# Patient Record
Sex: FEMALE | Race: BLACK OR AFRICAN AMERICAN | Employment: PART TIME
[De-identification: names, ages, dates, MRNs, and addresses within clinical notes are randomized per-mention and may not be internally consistent; named-entity substitution may affect disease eponyms.]

---

## 2022-08-17 ENCOUNTER — OFFICE VISIT (OUTPATIENT)
Dept: FAMILY MEDICINE CLINIC | Facility: CLINIC | Age: 42
End: 2022-08-17
Payer: COMMERCIAL

## 2022-08-17 VITALS
HEART RATE: 79 BPM | BODY MASS INDEX: 51.91 KG/M2 | WEIGHT: 293 LBS | OXYGEN SATURATION: 100 % | DIASTOLIC BLOOD PRESSURE: 82 MMHG | HEIGHT: 63 IN | SYSTOLIC BLOOD PRESSURE: 138 MMHG

## 2022-08-17 DIAGNOSIS — Z13.6 SCREENING FOR HEART DISEASE: ICD-10-CM

## 2022-08-17 DIAGNOSIS — E66.01 MORBID OBESITY (HCC): ICD-10-CM

## 2022-08-17 DIAGNOSIS — G89.29 CHRONIC LEFT HIP PAIN: ICD-10-CM

## 2022-08-17 DIAGNOSIS — G89.29 CHRONIC PAIN OF RIGHT KNEE: Primary | ICD-10-CM

## 2022-08-17 DIAGNOSIS — R53.82 CHRONIC FATIGUE: ICD-10-CM

## 2022-08-17 DIAGNOSIS — M25.561 CHRONIC PAIN OF RIGHT KNEE: Primary | ICD-10-CM

## 2022-08-17 DIAGNOSIS — M25.552 CHRONIC LEFT HIP PAIN: ICD-10-CM

## 2022-08-17 DIAGNOSIS — Z00.00 ANNUAL PHYSICAL EXAM: ICD-10-CM

## 2022-08-17 LAB
ALBUMIN SERPL-MCNC: 3.3 G/DL (ref 3.5–5)
ALBUMIN/GLOB SERPL: 0.9 {RATIO} (ref 1.2–3.5)
ALP SERPL-CCNC: 87 U/L (ref 50–136)
ALT SERPL-CCNC: 16 U/L (ref 12–65)
ANION GAP SERPL CALC-SCNC: 2 MMOL/L (ref 7–16)
AST SERPL-CCNC: 13 U/L (ref 15–37)
BASOPHILS # BLD: 0 K/UL (ref 0–0.2)
BASOPHILS NFR BLD: 1 % (ref 0–2)
BILIRUB SERPL-MCNC: 0.4 MG/DL (ref 0.2–1.1)
BUN SERPL-MCNC: 11 MG/DL (ref 6–23)
CALCIUM SERPL-MCNC: 8.8 MG/DL (ref 8.3–10.4)
CHLORIDE SERPL-SCNC: 110 MMOL/L (ref 98–107)
CHOLEST SERPL-MCNC: 149 MG/DL
CO2 SERPL-SCNC: 25 MMOL/L (ref 21–32)
CREAT SERPL-MCNC: 0.8 MG/DL (ref 0.6–1)
DIFFERENTIAL METHOD BLD: ABNORMAL
EOSINOPHIL # BLD: 0.1 K/UL (ref 0–0.8)
EOSINOPHIL NFR BLD: 1 % (ref 0.5–7.8)
ERYTHROCYTE [DISTWIDTH] IN BLOOD BY AUTOMATED COUNT: 20 % (ref 11.9–14.6)
GLOBULIN SER CALC-MCNC: 3.8 G/DL (ref 2.3–3.5)
GLUCOSE SERPL-MCNC: 105 MG/DL (ref 65–100)
HCT VFR BLD AUTO: 30.4 % (ref 35.8–46.3)
HDLC SERPL-MCNC: 65 MG/DL (ref 40–60)
HDLC SERPL: 2.3 {RATIO}
HGB BLD-MCNC: 9.1 G/DL (ref 11.7–15.4)
IMM GRANULOCYTES # BLD AUTO: 0 K/UL (ref 0–0.5)
IMM GRANULOCYTES NFR BLD AUTO: 0 % (ref 0–5)
LDLC SERPL CALC-MCNC: 69 MG/DL
LYMPHOCYTES # BLD: 2.8 K/UL (ref 0.5–4.6)
LYMPHOCYTES NFR BLD: 35 % (ref 13–44)
MCH RBC QN AUTO: 21.9 PG (ref 26.1–32.9)
MCHC RBC AUTO-ENTMCNC: 29.9 G/DL (ref 31.4–35)
MCV RBC AUTO: 73.3 FL (ref 79.6–97.8)
MONOCYTES # BLD: 0.3 K/UL (ref 0.1–1.3)
MONOCYTES NFR BLD: 4 % (ref 4–12)
NEUTS SEG # BLD: 4.8 K/UL (ref 1.7–8.2)
NEUTS SEG NFR BLD: 59 % (ref 43–78)
NRBC # BLD: 0 K/UL (ref 0–0.2)
PLATELET # BLD AUTO: 387 K/UL (ref 150–450)
PMV BLD AUTO: 9.4 FL (ref 9.4–12.3)
POTASSIUM SERPL-SCNC: 3.8 MMOL/L (ref 3.5–5.1)
PROT SERPL-MCNC: 7.1 G/DL (ref 6.3–8.2)
RBC # BLD AUTO: 4.15 M/UL (ref 4.05–5.2)
SODIUM SERPL-SCNC: 137 MMOL/L (ref 136–145)
TRIGL SERPL-MCNC: 75 MG/DL (ref 35–150)
TSH, 3RD GENERATION: 1.06 UIU/ML (ref 0.36–3.74)
VLDLC SERPL CALC-MCNC: 15 MG/DL (ref 6–23)
WBC # BLD AUTO: 8 K/UL (ref 4.3–11.1)

## 2022-08-17 PROCEDURE — 99204 OFFICE O/P NEW MOD 45 MIN: CPT | Performed by: FAMILY MEDICINE

## 2022-08-17 ASSESSMENT — ENCOUNTER SYMPTOMS
BLOOD IN STOOL: 0
DIARRHEA: 0
CONSTIPATION: 0
EYE DISCHARGE: 0
SHORTNESS OF BREATH: 0
EYE ITCHING: 0
WHEEZING: 0
NAUSEA: 0
BACK PAIN: 0
VOMITING: 0
COUGH: 0
EYE PAIN: 0
RHINORRHEA: 0
SORE THROAT: 0
CHEST TIGHTNESS: 0
EYE REDNESS: 0
ABDOMINAL PAIN: 0
COLOR CHANGE: 0
FACIAL SWELLING: 0
ABDOMINAL DISTENTION: 0
SINUS PRESSURE: 0
SINUS PAIN: 0
STRIDOR: 0

## 2022-08-17 ASSESSMENT — PATIENT HEALTH QUESTIONNAIRE - PHQ9
SUM OF ALL RESPONSES TO PHQ9 QUESTIONS 1 & 2: 0
SUM OF ALL RESPONSES TO PHQ QUESTIONS 1-9: 0
2. FEELING DOWN, DEPRESSED OR HOPELESS: 0
1. LITTLE INTEREST OR PLEASURE IN DOING THINGS: 0
SUM OF ALL RESPONSES TO PHQ QUESTIONS 1-9: 0

## 2022-08-17 NOTE — PROGRESS NOTES
[address] [place] [address]  _______________________________________  Kody Rachel MD                 [address] [place] [address] Drive, [address]. [address], [address] [facility]                                                                                    Phone: [phone]                                                                                    Fax: [phone]    Anabela Salgado is a 36 y.o. female who is seen for evaluation of   Chief Complaint   Patient presents with    Establish Care     Pt hasn't seen a PCP in a few years. Ear Problem     Right ear fullness since having covid 8/2021. Joint Pain     Right knee and left hip pain/arthritis - tylenol and motrin helps. Both shoulders have started aching recently. HPI:   Jasmin Salcedo is a 35 y/o F here to establish care. She has not seen a PCP in a number of years. - c/o multiple joint pain x 10 yrs. R knee, L hip mostly. Constant pain, achy in nature. Rates 6/10, 8-9/10 at worst. Worse with mobility and working- works as after . Better with rest. Knee and ankle swell at times. Hip is more painful with rainy weather. Motrin helps both. - c/o R ear fullness since having covid in 8/2021. Sounds like a swishing sound. Helps to hold head to R side. - pt inquires about wt loss. She has tried limiting carbs, which she finds difficulty with. She is unable to aggressively exercise with her hip and knee pain. Review of Systems:  Review of Systems   Constitutional:  Negative for activity change, appetite change, chills, diaphoresis, fatigue, fever and unexpected weight change. HENT:  Negative for congestion, ear discharge, ear pain, facial swelling, hearing loss, mouth sores, nosebleeds, postnasal drip, rhinorrhea, sinus pressure, sinus pain, sore throat and tinnitus. Eyes:  Negative for pain, discharge, redness, itching and visual disturbance.    Respiratory:  Negative for cough, chest tightness, shortness of breath, wheezing and stridor. Cardiovascular:  Negative for chest pain, palpitations and leg swelling. Gastrointestinal:  Negative for abdominal distention, abdominal pain, blood in stool, constipation, diarrhea, nausea and vomiting. Endocrine: Negative for cold intolerance, heat intolerance, polydipsia, polyphagia and polyuria. Genitourinary:  Negative for decreased urine volume, difficulty urinating, dysuria, flank pain, frequency, hematuria and urgency. Musculoskeletal:  Positive for arthralgias. Negative for back pain, gait problem, joint swelling, myalgias and neck pain. Skin:  Negative for color change, pallor, rash and wound. Neurological:  Negative for dizziness, tremors, seizures, syncope, facial asymmetry, speech difficulty, weakness, light-headedness, numbness and headaches. Psychiatric/Behavioral:  Negative for agitation, behavioral problems, confusion, hallucinations, self-injury, sleep disturbance and suicidal ideas. The patient is not nervous/anxious. History:  History reviewed. No pertinent past medical history. Past Surgical History:   Procedure Laterality Date     SECTION N/A     x 3       Family History   Problem Relation Age of Onset    Schizophrenia Mother     No Known Problems Father        Social History     Tobacco Use    Smoking status: Never    Smokeless tobacco: Never   Substance Use Topics    Alcohol use: Never       No Known Allergies    Current Outpatient Medications   Medication Sig Dispense Refill    diclofenac sodium (VOLTAREN) 1 % GEL Apply 2 g topically 4 times daily 100 g 3     No current facility-administered medications for this visit. Vitals:    /82 (Site: Left Upper Arm, Position: Sitting, Cuff Size: Large Adult)   Pulse 79   Ht 5' 2.5\" (1.588 m)   Wt (!) 343 lb (155.6 kg)   SpO2 100%   BMI 61.74 kg/m²     Physical Exam:  Physical Exam  Vitals reviewed.    Constitutional:       General: She is not in acute distress. Appearance: Normal appearance. She is not ill-appearing, toxic-appearing or diaphoretic. HENT:      Head: Normocephalic and atraumatic. Right Ear: Tympanic membrane, ear canal and external ear normal. There is no impacted cerumen. Left Ear: Tympanic membrane, ear canal and external ear normal. There is no impacted cerumen. Nose: Nose normal. No congestion or rhinorrhea. Mouth/Throat:      Mouth: Mucous membranes are moist.      Pharynx: No oropharyngeal exudate or posterior oropharyngeal erythema. Eyes:      General: No scleral icterus. Right eye: No discharge. Left eye: No discharge. Extraocular Movements: Extraocular movements intact. Conjunctiva/sclera: Conjunctivae normal.      Pupils: Pupils are equal, round, and reactive to light. Cardiovascular:      Rate and Rhythm: Normal rate and regular rhythm. Pulses: Normal pulses. Heart sounds: Normal heart sounds. No murmur heard. No friction rub. No gallop. Pulmonary:      Effort: Pulmonary effort is normal. No respiratory distress. Breath sounds: Normal breath sounds. No stridor. No wheezing, rhonchi or rales. Chest:      Chest wall: No tenderness. Abdominal:      General: Abdomen is flat. Bowel sounds are normal. There is no distension. Palpations: Abdomen is soft. There is no mass. Tenderness: There is no abdominal tenderness. There is no right CVA tenderness, left CVA tenderness, guarding or rebound. Musculoskeletal:         General: No swelling, tenderness, deformity or signs of injury. Cervical back: Neck supple. No rigidity or tenderness. Right knee: Crepitus present. No swelling, deformity or bony tenderness. Left knee: No swelling, deformity, bony tenderness or crepitus. Right lower leg: No edema. Left lower leg: No edema. Lymphadenopathy:      Cervical: No cervical adenopathy. Skin:     General: Skin is warm and dry. c/s will be needed.               Geremias Mcknight MD

## 2022-08-17 NOTE — ASSESSMENT & PLAN NOTE
Discussed wt loss options at length. Encouraged starting small with reduction in portion sizes and sweetened beverages. Goal of 1.5% BW loss/month for now. Increase goal as pt is successful. Otherwise, bariatric surgery c/s will be needed.

## 2022-08-17 NOTE — ASSESSMENT & PLAN NOTE
Not tender on PE. Suspect possible arthritic process given hx. I explained that I think wt loss will have a significant benefit on her pain and with such, allow for increase in activity level which will provide long-term benefit.

## 2022-08-18 ENCOUNTER — TELEPHONE (OUTPATIENT)
Dept: FAMILY MEDICINE CLINIC | Facility: CLINIC | Age: 42
End: 2022-08-18

## 2022-08-18 DIAGNOSIS — D64.9 ANEMIA, UNSPECIFIED TYPE: Primary | ICD-10-CM

## 2022-08-18 NOTE — TELEPHONE ENCOUNTER
The patient has been notified of this information and all questions answered. Elaine Alvarez, can you please add her to the lab schedule for tomorrow 8/19 at 8:45am? She will come in for iron studies.     Arielle Lemus

## 2022-08-18 NOTE — TELEPHONE ENCOUNTER
----- Message from [id number] Patricia Pearce MD sent at 8/18/2022  8:21 AM EDT -----  Please notify pt of abnormal results. Please have pt return for labs only to collect iron studies as her hgb is low.

## 2022-08-19 DIAGNOSIS — D64.9 ANEMIA, UNSPECIFIED TYPE: ICD-10-CM

## 2022-08-19 LAB
FERRITIN SERPL-MCNC: 8 NG/ML (ref 8–388)
IRON SERPL-MCNC: 15 UG/DL (ref 35–150)
TIBC SERPL-MCNC: 436 UG/DL (ref 250–450)

## 2022-09-02 ENCOUNTER — OFFICE VISIT (OUTPATIENT)
Dept: FAMILY MEDICINE CLINIC | Facility: CLINIC | Age: 42
End: 2022-09-02
Payer: COMMERCIAL

## 2022-09-02 VITALS
BODY MASS INDEX: 51.91 KG/M2 | HEIGHT: 63 IN | WEIGHT: 293 LBS | OXYGEN SATURATION: 99 % | SYSTOLIC BLOOD PRESSURE: 136 MMHG | DIASTOLIC BLOOD PRESSURE: 88 MMHG | HEART RATE: 82 BPM

## 2022-09-02 DIAGNOSIS — M25.552 CHRONIC LEFT HIP PAIN: ICD-10-CM

## 2022-09-02 DIAGNOSIS — Z12.31 ENCOUNTER FOR SCREENING MAMMOGRAM FOR MALIGNANT NEOPLASM OF BREAST: ICD-10-CM

## 2022-09-02 DIAGNOSIS — E66.01 MORBID OBESITY (HCC): ICD-10-CM

## 2022-09-02 DIAGNOSIS — D50.0 IRON DEFICIENCY ANEMIA DUE TO CHRONIC BLOOD LOSS: ICD-10-CM

## 2022-09-02 DIAGNOSIS — J30.1 ALLERGIC RHINITIS DUE TO POLLEN, UNSPECIFIED SEASONALITY: ICD-10-CM

## 2022-09-02 DIAGNOSIS — G89.29 CHRONIC LEFT HIP PAIN: ICD-10-CM

## 2022-09-02 DIAGNOSIS — Z00.00 ANNUAL PHYSICAL EXAM: Primary | ICD-10-CM

## 2022-09-02 DIAGNOSIS — R53.82 CHRONIC FATIGUE: ICD-10-CM

## 2022-09-02 DIAGNOSIS — Z01.419 ENCOUNTER FOR GYNECOLOGICAL EXAMINATION WITHOUT ABNORMAL FINDING: ICD-10-CM

## 2022-09-02 PROCEDURE — 99396 PREV VISIT EST AGE 40-64: CPT | Performed by: FAMILY MEDICINE

## 2022-09-02 RX ORDER — DOCUSATE SODIUM 100 MG/1
100 CAPSULE, LIQUID FILLED ORAL 2 TIMES DAILY
Qty: 180 CAPSULE | Refills: 0 | Status: SHIPPED | OUTPATIENT
Start: 2022-09-02 | End: 2022-12-01

## 2022-09-02 RX ORDER — LANOLIN ALCOHOL/MO/W.PET/CERES
325 CREAM (GRAM) TOPICAL
Qty: 270 TABLET | Refills: 0 | Status: SHIPPED | OUTPATIENT
Start: 2022-09-02 | End: 2022-10-31

## 2022-09-02 RX ORDER — ASCORBIC ACID 250 MG
250 TABLET ORAL 3 TIMES DAILY
Qty: 270 TABLET | Refills: 0 | Status: SHIPPED | OUTPATIENT
Start: 2022-09-02 | End: 2022-12-01

## 2022-09-02 RX ORDER — CETIRIZINE HYDROCHLORIDE 10 MG/1
10 TABLET ORAL DAILY
Qty: 30 TABLET | Refills: 0 | Status: SHIPPED | OUTPATIENT
Start: 2022-09-02 | End: 2022-10-02

## 2022-09-02 NOTE — PROGRESS NOTES
[address] [place] [address]  _______________________________________  Cadence Morales MD                 [address] [place] [address],  [address] [place], [address] [place] [address]                     [facility]                                                                                     Phone: [phone]                                                                                    Fax: [phone]    Flori Angulo is a 36 y.o. female who is seen for evaluation of   Chief Complaint   Patient presents with    Annual Exam    Knee Pain     Diclofenac gel didn't help with knee pain       HPI:   Elena Ascencio is here today for CPE. Review of Systems:  Review of Systems     History:  History reviewed. No pertinent past medical history. Past Surgical History:   Procedure Laterality Date     SECTION N/A     x 3       Family History   Problem Relation Age of Onset    Schizophrenia Mother     No Known Problems Father        Social History     Tobacco Use    Smoking status: Never    Smokeless tobacco: Never   Substance Use Topics    Alcohol use: Never       No Known Allergies    Current Outpatient Medications   Medication Sig Dispense Refill    ferrous sulfate (FE TABS 325) 325 (65 Fe) MG EC tablet Take 1 tablet by mouth 3 times daily (with meals) Take Vitamin C with each dose. 270 tablet 0    docusate sodium (COLACE) 100 MG capsule Take 1 capsule by mouth 2 times daily 180 capsule 0    ascorbic acid (V-R VITAMIN C) 250 MG tablet Take 1 tablet by mouth 3 times daily Take with each dose of iron 270 tablet 0    cetirizine (ZYRTEC) 10 MG tablet Take 1 tablet by mouth daily 30 tablet 0    diclofenac sodium (VOLTAREN) 1 % GEL Apply 2 g topically 4 times daily (Patient not taking: Reported on 2022) 100 g 3     No current facility-administered medications for this visit.        Vitals:    /88 (Site: Right Upper Arm, Position: Sitting, Cuff Size: Large Adult)   Pulse 82   Ht 5' 2.5\" (1.588 m)   Wt (!) breast  Z12.31 AKIL Screening Bilateral      6. Chronic fatigue  R53.82       7. Chronic left hip pain  M25.552     G89.29       8. Morbid obesity ([address]  E66.01            Problem List          Other    Chronic left hip pain     Pt will obtain xrays and will call with results. Advised importance of wt loss. Chronic fatigue     Monitor on Fe replacement. Morbid obesity (HCC)      Goal of 3 % loss of bw per month. Encouraged to limit carbs, portion sizes, eat 5 small meals daily, use veggies and nuts as snacks, increase exercise to 30 minutes of aerobic exercise 5 times weekly, weigh daily. Annual physical exam - Primary      Encouraged 5 servings of fruits and veggies daily. Instructed to drink approx 2 L of fluid daily, mostly water. Recommended 30 sustained minutes of aerobic exercise daily (e.g. cycling, rowing, jogging). Limit high calorie processed foods, red meat, egg yolks, dairy products, butter, burns, fried and fast foods. Recommended influenza vaccination annually. Iron deficiency anemia due to chronic blood loss     Start Fe replacement. Recheck cbc and iron in 3 months.           Relevant Medications    ferrous sulfate (FE TABS 325) 325 (65 Fe) MG EC tablet    docusate sodium (COLACE) 100 MG capsule    ascorbic acid (V-R VITAMIN C) 250 MG tablet    Other Relevant Orders    CBC with Auto Differential    Iron    Ferritin    Total Iron Binding Capacity        Kaylee Kwok III, MD

## 2022-09-02 NOTE — ASSESSMENT & PLAN NOTE
Encouraged 5 servings of fruits and veggies daily. Instructed to drink approx 2 L of fluid daily, mostly water. Recommended 30 sustained minutes of aerobic exercise daily (e.g. cycling, rowing, jogging). Limit high calorie processed foods, red meat, egg yolks, dairy products, butter, burns, fried and fast foods. Recommended influenza vaccination annually.

## 2022-10-02 PROBLEM — Z00.00 ANNUAL PHYSICAL EXAM: Status: RESOLVED | Noted: 2022-09-02 | Resolved: 2022-10-02

## 2022-10-11 ASSESSMENT — SOCIAL DETERMINANTS OF HEALTH (SDOH)

## 2022-10-12 ENCOUNTER — HOSPITAL ENCOUNTER (OUTPATIENT)
Dept: GENERAL RADIOLOGY | Age: 42
Discharge: HOME | End: 2022-10-14
Payer: COMMERCIAL

## 2022-10-12 ENCOUNTER — OFFICE VISIT (OUTPATIENT)
Dept: OBGYN CLINIC | Age: 42
End: 2022-10-12
Payer: COMMERCIAL

## 2022-10-12 VITALS
DIASTOLIC BLOOD PRESSURE: 88 MMHG | HEIGHT: 63 IN | WEIGHT: 293 LBS | BODY MASS INDEX: 51.91 KG/M2 | SYSTOLIC BLOOD PRESSURE: 138 MMHG

## 2022-10-12 DIAGNOSIS — Z01.419 WELL WOMAN EXAM WITH ROUTINE GYNECOLOGICAL EXAM: Primary | ICD-10-CM

## 2022-10-12 DIAGNOSIS — Z12.31 ENCOUNTER FOR SCREENING MAMMOGRAM FOR MALIGNANT NEOPLASM OF BREAST: ICD-10-CM

## 2022-10-12 DIAGNOSIS — Z11.51 SCREENING FOR HPV (HUMAN PAPILLOMAVIRUS): ICD-10-CM

## 2022-10-12 DIAGNOSIS — M25.561 CHRONIC PAIN OF RIGHT KNEE: ICD-10-CM

## 2022-10-12 DIAGNOSIS — G89.29 CHRONIC PAIN OF RIGHT KNEE: ICD-10-CM

## 2022-10-12 DIAGNOSIS — Z12.4 PAP SMEAR FOR CERVICAL CANCER SCREENING: ICD-10-CM

## 2022-10-12 DIAGNOSIS — G89.29 CHRONIC LEFT HIP PAIN: ICD-10-CM

## 2022-10-12 DIAGNOSIS — M25.552 CHRONIC LEFT HIP PAIN: ICD-10-CM

## 2022-10-12 PROCEDURE — 99386 PREV VISIT NEW AGE 40-64: CPT | Performed by: OBSTETRICS & GYNECOLOGY

## 2022-10-12 PROCEDURE — 73502 X-RAY EXAM HIP UNI 2-3 VIEWS: CPT

## 2022-10-12 PROCEDURE — 73560 X-RAY EXAM OF KNEE 1 OR 2: CPT

## 2022-10-12 NOTE — PROGRESS NOTES
CC:  Annual     HPI:  36 y.o. No obstetric history on file. presents today for a routine gynecological examination. Patient's last menstrual period was 2022. PCP: Yes, [facility]   Iron def anemia. Last Hg 9.1    Contraception:  not sexually active       Menses:  Regular   No contraindications to estrogen exist          GYN hx:  As per HPI    Last Pap smear result: unsure  Abnormal Pap history: no    Last MGM never       OB Hx:     OB History    Para Term  AB Living   4 3 3   1 3   SAB IAB Ectopic Molar Multiple Live Births                    # Outcome Date GA Lbr Agustin/2nd Weight Sex Delivery Anes PTL Lv   4 AB            3 Term            2 Term            1 Term              Hx of CS x 3      PMH:  Past Medical History:   Diagnosis Date    Anemia        PSH:  Past Surgical History:   Procedure Laterality Date     SECTION N/A     x 3         Allergies:  No Known Allergies      Social Hx:    reports that she has never smoked. She has never used smokeless tobacco. She reports that she does not currently use drugs. She reports that she does not drink alcohol. .    Family Hx:  Family History   Problem Relation Age of Onset    No Known Problems Father     Schizophrenia Mother     Breast Cancer Neg Hx     Colon Cancer Neg Hx     Ovarian Cancer Neg Hx          Review of Systems   Constitutional: Negative for chills, fever and weight loss. HENT: Negative for hearing loss. Eyes: Negative for blurred vision and double vision. Respiratory: Negative for cough, hemoptysis and shortness of breath. Cardiovascular: Negative for chest pain, palpitations and orthopnea. Gastrointestinal: Negative for abdominal pain, blood in stool, constipation, diarrhea, nausea and vomiting. Genitourinary: Negative for dysuria, frequency, hematuria and urgency. Musculoskeletal: Negative for falls, joint pain and myalgias. Skin: Negative for itching and rash. Neurological: Negative for headaches. Endo/Heme/Allergies: Does not bruise/bleed easily. Psychiatric/Behavioral: Negative for depression and suicidal ideas. The patient is not nervous/anxious. All other systems reviewed and are negative. Physical Exam      Vitals:    10/12/22 0849   BP: 138/88        Constitutional: She appears well-developed and well-nourished. No distress. HENT:    Head: Normocephalic and atraumatic. Neck: Normal range of motion. No thyromegaly present. Cardiovascular: Normal rate, regular rhythm and normal heart sounds. Exam reveals no gallop and no friction rub. No murmur heard. Pulmonary/Chest: Effort normal and breath sounds normal. No respiratory distress. She has no wheezes. She has no rales. Abdominal: Soft. Bowel sounds are normal. She exhibits no distension and no mass. There is no tenderness. There is no rebound and no guarding. Skin: She is not diaphoretic. Psychiatric: She has a normal mood and affect. Her behavior is normal. Thought content normal. .    Pelvic:   External genitalia wnl, no lesions, rashes  Clitoris and urethra midline  Vagina pink, moist, well rugated  Due to patient body habitus, very difficult pelvic exam. Long speculum used on could see anterior lip of cervix, but not entire cervix or os. Pap performed    Breasts:   Symmetric, no lesions, masses, rashes, no abnl nipple Dc. Dense breast tissue      Counseling:  Discussed General Recommendations (pts 40-65yo):  -Routine Pap  (unless cervix removed for benign reasons and not hx high grade dysplasia [ie ENEDELIA 2-3])  -Routine STD testing for high risk individuals   -Routine Mammogram   -lipid profile every 5 yrs  -colonoscopy (>/=46yo)  -Tdap once and then Td every 10yrs  -TSH every 5 years (>50)  -Zoster Vaccine (age 61), single dose ZVL or (age 48) 2 doses RZV  -Influenza Vaccine, annually  -Healthy eating/exercise   -HPV vaccine newest recs (8yo-46yo)        ASSESSMENT/PLAN:   36 y.o. No obstetric history on file.  for annual GYN exam:     -Cotesting today - discussed may be inadequate due to difficult exam    - Mammogram  referral   -safe sexual practices    -FU w/ PCP for all non-GYN medical issues and regular screening for lipids,TSH, diabetes    - If she every misses more than 3 cycles should call and will need cycled with progesterone to avoid endometrial hyperplasia risk in setting of morbid obesity       eNena Raymond, DO

## 2022-10-18 LAB
CYTOLOGIST CVX/VAG CYTO: NORMAL
CYTOLOGY CVX/VAG DOC THIN PREP: NORMAL
HPV APTIMA: NEGATIVE
Lab: NORMAL
PATH REPORT.FINAL DX SPEC: NORMAL
STAT OF ADQ CVX/VAG CYTO-IMP: NORMAL

## 2022-10-21 ENCOUNTER — TELEPHONE (OUTPATIENT)
Dept: FAMILY MEDICINE CLINIC | Facility: CLINIC | Age: 42
End: 2022-10-21

## 2022-10-21 DIAGNOSIS — M16.12 ARTHRITIS OF LEFT HIP: Primary | ICD-10-CM

## 2022-10-21 DIAGNOSIS — M17.11 ARTHRITIS OF RIGHT KNEE: Primary | ICD-10-CM

## 2022-10-24 ENCOUNTER — TELEPHONE (OUTPATIENT)
Dept: FAMILY MEDICINE CLINIC | Facility: CLINIC | Age: 42
End: 2022-10-24

## 2022-10-24 NOTE — TELEPHONE ENCOUNTER
The patient has been notified of this information and all questions answered. Pt will proceed with physical therapy.       Arielle Davila

## 2022-10-24 NOTE — TELEPHONE ENCOUNTER
----- Message from [id number] Patricia Pearce MD sent at 10/21/2022  8:29 AM EDT -----  Please notify pt of abnormal results. Pt has more arthritis of the left hip than expected. I would recommend PT, but wt loss will be the most effective tx. If she does not improve with PT, she will need to see ortho.

## 2022-10-28 DIAGNOSIS — D50.0 IRON DEFICIENCY ANEMIA DUE TO CHRONIC BLOOD LOSS: ICD-10-CM

## 2022-10-31 RX ORDER — LANOLIN ALCOHOL/MO/W.PET/CERES
325 CREAM (GRAM) TOPICAL
Qty: 90 TABLET | Refills: 2 | Status: SHIPPED | OUTPATIENT
Start: 2022-10-31 | End: 2023-01-29

## 2022-11-29 DIAGNOSIS — D50.0 IRON DEFICIENCY ANEMIA DUE TO CHRONIC BLOOD LOSS: ICD-10-CM

## 2022-11-29 LAB
BASOPHILS # BLD: 0.1 K/UL (ref 0–0.2)
BASOPHILS NFR BLD: 1 % (ref 0–2)
DIFFERENTIAL METHOD BLD: ABNORMAL
EOSINOPHIL # BLD: 0.1 K/UL (ref 0–0.8)
EOSINOPHIL NFR BLD: 1 % (ref 0.5–7.8)
ERYTHROCYTE [DISTWIDTH] IN BLOOD BY AUTOMATED COUNT: 21.4 % (ref 11.9–14.6)
HCT VFR BLD AUTO: 33.8 % (ref 35.8–46.3)
HGB BLD-MCNC: 10.1 G/DL (ref 11.7–15.4)
IMM GRANULOCYTES # BLD AUTO: 0 K/UL (ref 0–0.5)
IMM GRANULOCYTES NFR BLD AUTO: 0 % (ref 0–5)
LYMPHOCYTES # BLD: 3.4 K/UL (ref 0.5–4.6)
LYMPHOCYTES NFR BLD: 38 % (ref 13–44)
MCH RBC QN AUTO: 23 PG (ref 26.1–32.9)
MCHC RBC AUTO-ENTMCNC: 29.9 G/DL (ref 31.4–35)
MCV RBC AUTO: 76.8 FL (ref 82–102)
MONOCYTES # BLD: 0.4 K/UL (ref 0.1–1.3)
MONOCYTES NFR BLD: 5 % (ref 4–12)
NEUTS SEG # BLD: 5 K/UL (ref 1.7–8.2)
NEUTS SEG NFR BLD: 56 % (ref 43–78)
NRBC # BLD: 0 K/UL (ref 0–0.2)
PLATELET # BLD AUTO: 396 K/UL (ref 150–450)
PMV BLD AUTO: 8.9 FL (ref 9.4–12.3)
RBC # BLD AUTO: 4.4 M/UL (ref 4.05–5.2)
WBC # BLD AUTO: 9 K/UL (ref 4.3–11.1)

## 2022-11-30 LAB
FERRITIN SERPL-MCNC: 10 NG/ML (ref 8–388)
IRON SERPL-MCNC: 25 UG/DL (ref 35–150)
TIBC SERPL-MCNC: 391 UG/DL (ref 250–450)

## 2022-12-06 ENCOUNTER — OFFICE VISIT (OUTPATIENT)
Dept: FAMILY MEDICINE CLINIC | Facility: CLINIC | Age: 42
End: 2022-12-06
Payer: COMMERCIAL

## 2022-12-06 VITALS
HEART RATE: 84 BPM | BODY MASS INDEX: 51.91 KG/M2 | WEIGHT: 293 LBS | DIASTOLIC BLOOD PRESSURE: 90 MMHG | OXYGEN SATURATION: 94 % | SYSTOLIC BLOOD PRESSURE: 148 MMHG | HEIGHT: 63 IN

## 2022-12-06 DIAGNOSIS — D50.0 IRON DEFICIENCY ANEMIA DUE TO CHRONIC BLOOD LOSS: Primary | ICD-10-CM

## 2022-12-06 DIAGNOSIS — M17.11 TRICOMPARTMENT OSTEOARTHRITIS OF RIGHT KNEE: ICD-10-CM

## 2022-12-06 DIAGNOSIS — E66.01 MORBID OBESITY (HCC): ICD-10-CM

## 2022-12-06 DIAGNOSIS — M16.12 ARTHRITIS OF LEFT HIP: ICD-10-CM

## 2022-12-06 PROCEDURE — 99214 OFFICE O/P EST MOD 30 MIN: CPT | Performed by: FAMILY MEDICINE

## 2022-12-06 RX ORDER — MELOXICAM 7.5 MG/1
7.5 TABLET ORAL DAILY
Qty: 30 TABLET | Refills: 3 | Status: SHIPPED | OUTPATIENT
Start: 2022-12-06

## 2022-12-06 RX ORDER — ASCORBIC ACID 250 MG
250 TABLET ORAL 3 TIMES DAILY
Qty: 270 TABLET | Refills: 0 | Status: SHIPPED | OUTPATIENT
Start: 2022-12-06 | End: 2023-03-06

## 2022-12-06 RX ORDER — LANOLIN ALCOHOL/MO/W.PET/CERES
325 CREAM (GRAM) TOPICAL
Qty: 90 TABLET | Refills: 0 | Status: SHIPPED | OUTPATIENT
Start: 2022-12-06 | End: 2023-03-06

## 2022-12-06 ASSESSMENT — ENCOUNTER SYMPTOMS
EYE ITCHING: 0
SINUS PRESSURE: 0
COLOR CHANGE: 0
WHEEZING: 0
EYE REDNESS: 0
FACIAL SWELLING: 0
SORE THROAT: 0
DIARRHEA: 0
ABDOMINAL DISTENTION: 0
NAUSEA: 0
BLOOD IN STOOL: 0
CHEST TIGHTNESS: 0
ABDOMINAL PAIN: 0
BACK PAIN: 0
SHORTNESS OF BREATH: 0
VOMITING: 0
CONSTIPATION: 0
STRIDOR: 0
SINUS PAIN: 0
RHINORRHEA: 0
COUGH: 0
EYE DISCHARGE: 0
EYE PAIN: 0

## 2022-12-06 NOTE — PROGRESS NOTES
[address] [place] [address]  _______________________________________  Allison Smith MD                 [address] [place] [address],  [address]. [address], [address] [facility]                     Miracle Mohan                                                                                     Phone: [phone]                                                                                    Fax: [phone]    Walter Naylor is a 36 y.o. female who is seen for evaluation of   Chief Complaint   Patient presents with    Anemia       HPI:   Minerva Scott is a 35 y/o F here to establish care. She has not seen a PCP in a number of years. - c/o multiple joint pain x 10 yrs. R knee, L hip mostly. Constant pain, achy in nature. Rates 6/10, 8-9/10 at worst. Worse with mobility and working- works as after . Better with rest. Knee and ankle swell at times. Hip is more painful with rainy weather. Motrin helps both. Xray of R knee showed severe tricompartmental OA. L hip xray shows severe advanced OA. Pt states her knee pain has been a bit improved with increasing her activity level. - previously c/o R ear fullness since having covid in 8/2021. Sounds like a swishing sound. Helps to hold head to R side. Pt states her ears have been better with allergy meds. - BP elevated today. Pt denies any cp, h/a, sob.     - HOMAR- hgb up 1 gr on iron TID. Denies any GI intolerance. Lab Results   Component Value Date    WBC 9.0 11/29/2022    HGB 10.1 (L) 11/29/2022    HCT 33.8 (L) 11/29/2022    MCV 76.8 (L) 11/29/2022     11/29/2022     Lab Results   Component Value Date    IRON 25 (L) 11/29/2022    TIBC 391 11/29/2022    FERRITIN 10 11/29/2022     Xray Result (most recent):  XR KNEE RIGHT (1-2 VIEWS) 10/12/2022    Narrative  EXAMINATION: XR KNEE RIGHT (1-2 VIEWS) 10/12/2022 9:32 AM    ACCESSION NUMBER: [id number]    COMPARISON: None available    INDICATION: Chronic right knee pain.     TECHNIQUE: 3 views of the right knee were obtained. FINDINGS:  Linear sclerosis in the lateral tibial plateau. Otherwise, no acute fracture. No  dislocation. Tricompartmental joint space narrowing with osteophytosis,  including severe lateral compartment narrowing. Subchondral sclerosis and  subchondral cystic change at the lateral femorotibial articulation. No sizable  knee effusion. Impression  -Severe lateral femorotibial predominant tricompartmental right knee  osteoarthrosis.    -Horizontally oriented linear sclerosis in the lateral tibial plateau, favored  to be degenerative, though insufficiency fracture could have a similar  appearance. EXAMINATION: XR HIP LEFT (2-3 VIEWS) 10/12/2022 9:32 AM       ACCESSION NUMBER: [id number]       COMPARISON: Left hip radiographs 11/8/2012. INDICATION: Chronic left hip pain. TECHNIQUE: 2 views of the left hip were obtained. FINDINGS:   No acute fracture. The left hip and left sacroiliac joint are aligned   appropriately. Severe left hip narrowing with bone-on-bone apposition with   subchondral sclerosis, osteophytosis, and osseous remodeling. Impression   Severe left hip osteoarthrosis. Review of Systems:  Review of Systems   Constitutional:  Negative for activity change, appetite change, chills, diaphoresis, fatigue, fever and unexpected weight change. HENT:  Negative for congestion, ear discharge, ear pain, facial swelling, hearing loss, mouth sores, nosebleeds, postnasal drip, rhinorrhea, sinus pressure, sinus pain, sore throat and tinnitus. Eyes:  Negative for pain, discharge, redness, itching and visual disturbance. Respiratory:  Negative for cough, chest tightness, shortness of breath, wheezing and stridor. Cardiovascular:  Negative for chest pain, palpitations and leg swelling. Gastrointestinal:  Negative for abdominal distention, abdominal pain, blood in stool, constipation, diarrhea, nausea and vomiting.    Endocrine: Negative for cold intolerance, heat intolerance, polydipsia, polyphagia and polyuria. Genitourinary:  Negative for decreased urine volume, difficulty urinating, dysuria, flank pain, frequency, hematuria and urgency. Musculoskeletal:  Positive for arthralgias. Negative for back pain, gait problem, joint swelling, myalgias and neck pain. Skin:  Negative for color change, pallor, rash and wound. Neurological:  Negative for dizziness, tremors, seizures, syncope, facial asymmetry, speech difficulty, weakness, light-headedness, numbness and headaches. Psychiatric/Behavioral:  Negative for agitation, behavioral problems, confusion, hallucinations, self-injury, sleep disturbance and suicidal ideas. The patient is not nervous/anxious. History:  Past Medical History:   Diagnosis Date    Anemia        Past Surgical History:   Procedure Laterality Date     SECTION N/A     x 3       Family History   Problem Relation Age of Onset    No Known Problems Father     Schizophrenia Mother     Breast Cancer Neg Hx     Colon Cancer Neg Hx     Ovarian Cancer Neg Hx        Social History     Tobacco Use    Smoking status: Never    Smokeless tobacco: Never   Substance Use Topics    Alcohol use: Never       No Known Allergies    Current Outpatient Medications   Medication Sig Dispense Refill    ferrous sulfate (FE TABS 325) 325 (65 Fe) MG EC tablet Take 1 tablet by mouth 3 times daily (with meals) Take Vitamin C with each dose. 90 tablet 0    ascorbic acid (V-R VITAMIN C) 250 MG tablet Take 1 tablet by mouth 3 times daily Take with each dose of iron 270 tablet 0    meloxicam (MOBIC) 7.5 MG tablet Take 1 tablet by mouth daily 30 tablet 3    diclofenac sodium (VOLTAREN) 1 % GEL Apply 2 g topically 4 times daily 100 g 3     No current facility-administered medications for this visit.        Vitals:    BP (!) 148/90   Pulse 84   Ht 5' 2.5\" (1.588 m)   Wt (!) 330 lb (149.7 kg)   SpO2 94%   BMI 59.40 kg/m²     Physical Exam:  Physical Exam  Vitals reviewed. Constitutional:       General: She is not in acute distress. Appearance: Normal appearance. She is not ill-appearing, toxic-appearing or diaphoretic. HENT:      Head: Normocephalic and atraumatic. Right Ear: Tympanic membrane, ear canal and external ear normal. There is no impacted cerumen. Left Ear: Tympanic membrane, ear canal and external ear normal. There is no impacted cerumen. Nose: Nose normal. No congestion or rhinorrhea. Mouth/Throat:      Mouth: Mucous membranes are moist.      Pharynx: No oropharyngeal exudate or posterior oropharyngeal erythema. Eyes:      General: No scleral icterus. Right eye: No discharge. Left eye: No discharge. Extraocular Movements: Extraocular movements intact. Conjunctiva/sclera: Conjunctivae normal.      Pupils: Pupils are equal, round, and reactive to light. Cardiovascular:      Rate and Rhythm: Normal rate and regular rhythm. Pulses: Normal pulses. Heart sounds: Normal heart sounds. No murmur heard. No friction rub. No gallop. Pulmonary:      Effort: Pulmonary effort is normal. No respiratory distress. Breath sounds: Normal breath sounds. No stridor. No wheezing, rhonchi or rales. Chest:      Chest wall: No tenderness. Abdominal:      General: Abdomen is flat. Bowel sounds are normal. There is no distension. Palpations: Abdomen is soft. There is no mass. Tenderness: There is no abdominal tenderness. There is no right CVA tenderness, left CVA tenderness, guarding or rebound. Musculoskeletal:         General: No swelling, tenderness, deformity or signs of injury. Cervical back: Neck supple. No rigidity or tenderness. Right lower leg: No edema. Left lower leg: No edema. Lymphadenopathy:      Cervical: No cervical adenopathy. Skin:     General: Skin is warm and dry. Coloration: Skin is not jaundiced or pale.       Findings: No bruising, erythema, lesion or rash. Neurological:      General: No focal deficit present. Mental Status: She is alert. Mental status is at baseline. Motor: No weakness. Coordination: Coordination normal.      Gait: Gait normal.   Psychiatric:         Mood and Affect: Mood normal.         Behavior: Behavior normal.         Thought Content: Thought content normal.         Judgment: Judgment normal.       Assessment/Plan:     ICD-10-CM    1. Iron deficiency anemia due to chronic blood loss  D50.0 ferrous sulfate (FE TABS 325) 325 (65 Fe) MG EC tablet     ascorbic acid (V-R VITAMIN C) 250 MG tablet     CBC with Auto Differential     Iron     Ferritin     Total Iron Binding Capacity      2. Tricompartment osteoarthritis of right knee  M17.11       3. Arthritis of left hip  M16.12       4. Morbid obesity (HCC)  E66.01            Problem List          Musculoskeletal and Integument    Arthritis of left hip      Discussed importance of wt loss in tx. She was contacted by PT and rescheduled appt. Discussed need to exhaust PT and other conservative options before approaching surgical options given her age. Her BP will not allow for stimulant tx, but [facility] is an option pending insurance formulary issues. Instructed to call PT to schedule appt. Relevant Medications    meloxicam (MOBIC) 7.5 MG tablet    Tricompartment osteoarthritis of right knee     See arthritis hip. Relevant Medications    meloxicam (MOBIC) 7.5 MG tablet       Other    Morbid obesity ([address]       Encouraged to limit carbs, portion sizes, eat 5 small meals daily, use veggies and nuts as snacks, increase exercise to 30 minutes of aerobic exercise 5 times weekly, weigh daily. Goal of 3 % BW loss monthly. Wegovy would be great. Asked pt to call insurance to determine formulary alternative. Iron deficiency anemia due to chronic blood loss - Primary      Mild improvement. Will extend tx for another 3 months. Relevant Medications    ferrous sulfate (FE TABS 325) 325 (65 Fe) MG EC tablet    ascorbic acid (V-R VITAMIN C) 250 MG tablet    Other Relevant Orders    CBC with Auto Differential    Iron    Ferritin    Total Iron Binding Capacity        Darion Diego III, MD

## 2022-12-06 NOTE — ASSESSMENT & PLAN NOTE
Encouraged to limit carbs, portion sizes, eat 5 small meals daily, use veggies and nuts as snacks, increase exercise to 30 minutes of aerobic exercise 5 times weekly, weigh daily. Goal of 3 % BW loss monthly. Wegovy would be great. Asked pt to call insurance to determine formulary alternative.

## 2022-12-06 NOTE — ASSESSMENT & PLAN NOTE
Discussed importance of wt loss in tx. She was contacted by PT and rescheduled appt. Discussed need to exhaust PT and other conservative options before approaching surgical options given her age. Her BP will not allow for stimulant tx, but [facility] is an option pending insurance formulary issues. Instructed to call PT to schedule appt.

## 2023-01-05 DIAGNOSIS — D50.0 IRON DEFICIENCY ANEMIA DUE TO CHRONIC BLOOD LOSS: ICD-10-CM

## 2023-01-06 ENCOUNTER — OFFICE VISIT (OUTPATIENT)
Dept: FAMILY MEDICINE CLINIC | Facility: CLINIC | Age: 43
End: 2023-01-06

## 2023-01-06 VITALS
HEART RATE: 81 BPM | SYSTOLIC BLOOD PRESSURE: 146 MMHG | HEIGHT: 63 IN | OXYGEN SATURATION: 97 % | DIASTOLIC BLOOD PRESSURE: 92 MMHG | WEIGHT: 293 LBS | BODY MASS INDEX: 51.91 KG/M2

## 2023-01-06 DIAGNOSIS — R03.0 ELEVATED BLOOD PRESSURE READING: ICD-10-CM

## 2023-01-06 DIAGNOSIS — M17.11 TRICOMPARTMENT OSTEOARTHRITIS OF RIGHT KNEE: Primary | ICD-10-CM

## 2023-01-06 RX ORDER — ASCORBIC ACID 250 MG
TABLET ORAL
Qty: 100 TABLET | Refills: 2 | Status: SHIPPED | OUTPATIENT
Start: 2023-01-06

## 2023-01-06 ASSESSMENT — ENCOUNTER SYMPTOMS
COUGH: 0
BACK PAIN: 0
DIARRHEA: 0
CHEST TIGHTNESS: 0
EYE PAIN: 0
FACIAL SWELLING: 0
ABDOMINAL PAIN: 0
EYE REDNESS: 0
SINUS PAIN: 0
SORE THROAT: 0
EYE DISCHARGE: 0
ABDOMINAL DISTENTION: 0
BLOOD IN STOOL: 0
EYE ITCHING: 0
RHINORRHEA: 0
STRIDOR: 0
CONSTIPATION: 0
WHEEZING: 0
SINUS PRESSURE: 0
NAUSEA: 0
VOMITING: 0
SHORTNESS OF BREATH: 0
COLOR CHANGE: 0

## 2023-01-06 ASSESSMENT — PATIENT HEALTH QUESTIONNAIRE - PHQ9
1. LITTLE INTEREST OR PLEASURE IN DOING THINGS: 0
2. FEELING DOWN, DEPRESSED OR HOPELESS: 0
SUM OF ALL RESPONSES TO PHQ9 QUESTIONS 1 & 2: 0
SUM OF ALL RESPONSES TO PHQ QUESTIONS 1-9: 0

## 2023-01-06 NOTE — TELEPHONE ENCOUNTER
Last OV 12/6  No future appt    Requested Prescriptions     Pending Prescriptions Disp Refills    [facility]S VITAMIN C 250 MG tablet [Pharmacy Med Name: [facility]S VITAMIN C 250 MG TABLET] 100 tablet 2     Sig: TAKE 1 TABLET BY MOUTH 3 TIMES A DAY WITH MEALS AND IRON TABLET       Sheri Mclain MA

## 2023-01-06 NOTE — ASSESSMENT & PLAN NOTE
Documented elevated readings on 2 consecutive visits. Discussed need for wt loss and sodium restriction in diet. Discussed medication, which is warranted at this point. Pt wishes to trial more aggressive lifestyle modifications. Will plan to see her back in 3 months for recheck. Reiterated importance of not taking meloxicam daily.

## 2023-01-06 NOTE — ASSESSMENT & PLAN NOTE
Tolerated procedure well. Instructed to apply ice for 20 mins twice today. Recommended wt loss and regular exercise. PT referral in place.

## 2023-01-06 NOTE — PROGRESS NOTES
[address] [place] [address]  _______________________________________  Amber Snow MD                 [address] [place] [address],  [address] [place], [address] [place], [place] [address]                                                                                     Phone: [phone]                                                                                    Fax: [phone]    Davidson Hassan is a 36 y.o. female who is seen for evaluation of   Chief Complaint   Patient presents with    Procedure     Right knee pain - pt would like to have steroid injection. HPI:   - c/o multiple joint pain x 10 yrs. R knee. Constant pain, achy in nature. Rates 6/10, 8-9/10 at worst. Worse with mobility and working- works as after . Better with rest. Knee and ankle swell at times. More painful with rainy weather. Motrin helps both. Xray of R knee showed severe tricompartmental OA. L hip xray shows severe advanced OA. Pt states her knee pain has been a bit improved with increasing her activity level. She has an upcoming school field trip and wishes to try steroid injection.    - BP elevated today similar to what is was on 12/6/22. Pt denies any cp, h/a, sob. Xray Result (most recent):  XR KNEE RIGHT (1-2 VIEWS) 10/12/2022    Narrative  EXAMINATION: XR KNEE RIGHT (1-2 VIEWS) 10/12/2022 9:32 AM    ACCESSION NUMBER: [id number]    COMPARISON: None available    INDICATION: Chronic right knee pain. TECHNIQUE: 3 views of the right knee were obtained. FINDINGS:  Linear sclerosis in the lateral tibial plateau. Otherwise, no acute fracture. No  dislocation. Tricompartmental joint space narrowing with osteophytosis,  including severe lateral compartment narrowing. Subchondral sclerosis and  subchondral cystic change at the lateral femorotibial articulation. No sizable  knee effusion.     Impression  -Severe lateral femorotibial predominant tricompartmental right knee  osteoarthrosis.    -Horizontally oriented linear sclerosis in the lateral tibial plateau, favored  to be degenerative, though insufficiency fracture could have a similar  appearance. Review of Systems:  Review of Systems   Constitutional:  Negative for activity change, appetite change, chills, diaphoresis, fatigue, fever and unexpected weight change. HENT:  Negative for congestion, ear discharge, ear pain, facial swelling, hearing loss, mouth sores, nosebleeds, postnasal drip, rhinorrhea, sinus pressure, sinus pain, sore throat and tinnitus. Eyes:  Negative for pain, discharge, redness, itching and visual disturbance. Respiratory:  Negative for cough, chest tightness, shortness of breath, wheezing and stridor. Cardiovascular:  Negative for chest pain, palpitations and leg swelling. Gastrointestinal:  Negative for abdominal distention, abdominal pain, blood in stool, constipation, diarrhea, nausea and vomiting. Endocrine: Negative for cold intolerance, heat intolerance, polydipsia, polyphagia and polyuria. Genitourinary:  Negative for decreased urine volume, difficulty urinating, dysuria, flank pain, frequency, hematuria and urgency. Musculoskeletal:  Positive for arthralgias. Negative for back pain, gait problem, joint swelling, myalgias and neck pain. Skin:  Negative for color change, pallor, rash and wound. Neurological:  Negative for dizziness, tremors, seizures, syncope, facial asymmetry, speech difficulty, weakness, light-headedness, numbness and headaches. Psychiatric/Behavioral:  Negative for agitation, behavioral problems, confusion, hallucinations, self-injury, sleep disturbance and suicidal ideas. The patient is not nervous/anxious.        History:  Past Medical History:   Diagnosis Date    Anemia        Past Surgical History:   Procedure Laterality Date     SECTION N/A     x 3       Family History   Problem Relation Age of Onset    No Known Problems Father Schizophrenia Mother     Breast Cancer Neg Hx     Colon Cancer Neg Hx     Ovarian Cancer Neg Hx        Social History     Tobacco Use    Smoking status: Never    Smokeless tobacco: Never   Substance Use Topics    Alcohol use: Never       No Known Allergies    Current Outpatient Medications   Medication Sig Dispense Refill    CVS VITAMIN C 250 MG tablet TAKE 1 TABLET BY MOUTH 3 TIMES A DAY WITH MEALS AND IRON TABLET 100 tablet 2    ferrous sulfate (FE TABS 325) 325 (65 Fe) MG EC tablet Take 1 tablet by mouth 3 times daily (with meals) Take Vitamin C with each dose. 90 tablet 0    diclofenac sodium (VOLTAREN) 1 % GEL Apply 2 g topically 4 times daily 100 g 3    meloxicam (MOBIC) 7.5 MG tablet Take 1 tablet by mouth daily (Patient not taking: Reported on 1/6/2023) 30 tablet 3     No current facility-administered medications for this visit. Vitals:    BP (!) 146/92 (Site: Left Upper Arm, Position: Sitting, Cuff Size: Large Adult)   Pulse 81   Ht 5' 2.5\" (1.588 m)   Wt (!) 342 lb 3.2 oz (155.2 kg)   SpO2 97%   BMI 61.59 kg/m²     Physical Exam:  Physical Exam  Vitals reviewed. Constitutional:       General: She is not in acute distress. Appearance: Normal appearance. She is not ill-appearing, toxic-appearing or diaphoretic. HENT:      Head: Normocephalic and atraumatic. Right Ear: Tympanic membrane, ear canal and external ear normal. There is no impacted cerumen. Left Ear: Tympanic membrane, ear canal and external ear normal. There is no impacted cerumen. Nose: Nose normal. No congestion or rhinorrhea. Mouth/Throat:      Mouth: Mucous membranes are moist.      Pharynx: No oropharyngeal exudate or posterior oropharyngeal erythema. Eyes:      General: No scleral icterus. Right eye: No discharge. Left eye: No discharge. Extraocular Movements: Extraocular movements intact.       Conjunctiva/sclera: Conjunctivae normal.      Pupils: Pupils are equal, round, and reactive to light. Cardiovascular:      Rate and Rhythm: Normal rate and regular rhythm. Pulses: Normal pulses. Heart sounds: Normal heart sounds. No murmur heard. No friction rub. No gallop. Pulmonary:      Effort: Pulmonary effort is normal. No respiratory distress. Breath sounds: Normal breath sounds. No stridor. No wheezing, rhonchi or rales. Chest:      Chest wall: No tenderness. Abdominal:      General: Abdomen is flat. Bowel sounds are normal. There is no distension. Palpations: Abdomen is soft. There is no mass. Tenderness: There is no abdominal tenderness. There is no right CVA tenderness, left CVA tenderness, guarding or rebound. Musculoskeletal:         General: No swelling, tenderness, deformity or signs of injury. Cervical back: Neck supple. No rigidity or tenderness. Right lower leg: No edema. Left lower leg: No edema. Lymphadenopathy:      Cervical: No cervical adenopathy. Skin:     General: Skin is warm and dry. Coloration: Skin is not jaundiced or pale. Findings: No bruising, erythema, lesion or rash. Neurological:      General: No focal deficit present. Mental Status: She is alert. Mental status is at baseline. Motor: No weakness. Coordination: Coordination normal.      Gait: Gait normal.   Psychiatric:         Mood and Affect: Mood normal.         Behavior: Behavior normal.         Thought Content: Thought content normal.         Judgment: Judgment normal.     Joint Injection Procedure Note    PRE-OP DIAGNOSIS: _  POST-OP DIAGNOSIS: Same   PROCEDURE: joint injection  Performing Physician: _     Dose:       1%  Lidocaine     1 mL    Steroid 40mg/mL Depo-Medrol     Procedure: The area was prepped in the usual sterile manner. The needle  was inserted into the affected area and the steroid was injected. There were no complications during this procedure. Followup:  The patient tolerated the procedure well without complications. Standard post-procedure care is explained and return  precautions are given. Assessment/Plan:     ICD-10-CM    1. Tricompartment osteoarthritis of right knee  M17.11 20610 - PA DRAIN/INJECT LARGE JOINT/BURSA      2. Elevated blood pressure reading  R03.0            Problem List          Circulatory    Elevated blood pressure reading     Documented elevated readings on 2 consecutive visits. Discussed need for wt loss and sodium restriction in diet. Discussed medication, which is warranted at this point. Pt wishes to trial more aggressive lifestyle modifications. Will plan to see her back in 3 months for recheck. Musculoskeletal and Integument    Tricompartment osteoarthritis of right knee - Primary     Tolerated procedure well. Instructed to apply ice for 20 mins twice today. Recommended wt loss and regular exercise. PT referral in place.           Relevant Medications    meloxicam (MOBIC) 7.5 MG tablet    Other Relevant Orders    20610 - PA DRAIN/INJECT LARGE JOINT/BURSA        Maranda Green III, MD

## 2023-06-26 RX ORDER — MELOXICAM 7.5 MG/1
TABLET ORAL
Qty: 30 TABLET | Refills: 2 | OUTPATIENT
Start: 2023-06-26

## 2024-08-07 ENCOUNTER — OFFICE VISIT (OUTPATIENT)
Dept: OBGYN CLINIC | Age: 44
End: 2024-08-07
Payer: COMMERCIAL

## 2024-08-07 VITALS
BODY MASS INDEX: 51.91 KG/M2 | WEIGHT: 293 LBS | SYSTOLIC BLOOD PRESSURE: 130 MMHG | DIASTOLIC BLOOD PRESSURE: 82 MMHG | HEIGHT: 63 IN

## 2024-08-07 DIAGNOSIS — N92.0 MENORRHAGIA WITH REGULAR CYCLE: ICD-10-CM

## 2024-08-07 DIAGNOSIS — N92.0 MENORRHAGIA WITH REGULAR CYCLE: Primary | ICD-10-CM

## 2024-08-07 LAB — TSH W FREE THYROID IF ABNORMAL: 1.66 UIU/ML (ref 0.27–4.2)

## 2024-08-07 PROCEDURE — 99214 OFFICE O/P EST MOD 30 MIN: CPT | Performed by: OBSTETRICS & GYNECOLOGY

## 2024-08-07 RX ORDER — IBUPROFEN 800 MG/1
800 TABLET ORAL EVERY 6 HOURS PRN
COMMUNITY

## 2024-08-07 NOTE — PROGRESS NOTES
CC:   Chief Complaint   Patient presents with    Menorrhagia     Heavy menses         HPI:    Emily  is a 44 y.o. , , Patient's last menstrual period was 2024.,  who is seen for HMB. She has iron deficiency anemia due to this. When I last saw her we discussed cyclic progesterone to help with cycle. Is not taking. Last TSH  and wnl.       Menses:  Regular, heavy, + flooding. Painful    Not on anything for cycle control  Reports she is working on weight loss      GYN HISTORY:  As per HPI       Last Pap:   negative       Current Outpatient Medications on File Prior to Visit   Medication Sig Dispense Refill    ibuprofen (ADVIL;MOTRIN) 800 MG tablet Take 1 tablet by mouth every 6 hours as needed for Pain      CVS VITAMIN C 250 MG tablet TAKE 1 TABLET BY MOUTH 3 TIMES A DAY WITH MEALS AND IRON TABLET 100 tablet 2    diclofenac sodium (VOLTAREN) 1 % GEL Apply 2 g topically 4 times daily 100 g 3    ferrous sulfate (FE TABS 325) 325 (65 Fe) MG EC tablet Take 1 tablet by mouth 3 times daily (with meals) Take Vitamin C with each dose. 90 tablet 0    meloxicam (MOBIC) 7.5 MG tablet Take 1 tablet by mouth daily (Patient not taking: Reported on 2024) 30 tablet 3     No current facility-administered medications on file prior to visit.         Past Medical History:   Diagnosis Date    Anemia     Menopausal symptoms     Migraine          Past Surgical History:   Procedure Laterality Date     SECTION N/A     x 3         Outpatient Encounter Medications as of 2024   Medication Sig Dispense Refill    ibuprofen (ADVIL;MOTRIN) 800 MG tablet Take 1 tablet by mouth every 6 hours as needed for Pain      norethindrone (AYGESTIN) 5 MG tablet Take 1 tablet by mouth daily 30 tablet 12    CVS VITAMIN C 250 MG tablet TAKE 1 TABLET BY MOUTH 3 TIMES A DAY WITH MEALS AND IRON TABLET 100 tablet 2    diclofenac sodium (VOLTAREN) 1 % GEL Apply 2 g topically 4 times daily 100 g 3    ferrous sulfate (FE TABS

## 2024-08-08 ENCOUNTER — OFFICE VISIT (OUTPATIENT)
Dept: FAMILY MEDICINE CLINIC | Facility: CLINIC | Age: 44
End: 2024-08-08

## 2024-08-08 VITALS
HEART RATE: 72 BPM | DIASTOLIC BLOOD PRESSURE: 78 MMHG | WEIGHT: 293 LBS | HEIGHT: 63 IN | BODY MASS INDEX: 51.91 KG/M2 | SYSTOLIC BLOOD PRESSURE: 138 MMHG

## 2024-08-08 DIAGNOSIS — Z12.31 ENCOUNTER FOR SCREENING MAMMOGRAM FOR MALIGNANT NEOPLASM OF BREAST: ICD-10-CM

## 2024-08-08 DIAGNOSIS — Z13.6 SCREENING FOR HEART DISEASE: ICD-10-CM

## 2024-08-08 DIAGNOSIS — Z00.00 ENCOUNTER FOR ANNUAL PHYSICAL EXAM: ICD-10-CM

## 2024-08-08 DIAGNOSIS — M17.11 TRICOMPARTMENT OSTEOARTHRITIS OF RIGHT KNEE: ICD-10-CM

## 2024-08-08 DIAGNOSIS — D50.0 IRON DEFICIENCY ANEMIA DUE TO CHRONIC BLOOD LOSS: Primary | ICD-10-CM

## 2024-08-08 DIAGNOSIS — E55.9 VITAMIN D DEFICIENCY: ICD-10-CM

## 2024-08-08 DIAGNOSIS — R53.82 CHRONIC FATIGUE: ICD-10-CM

## 2024-08-08 LAB
25(OH)D3 SERPL-MCNC: 17.7 NG/ML (ref 30–100)
ALBUMIN SERPL-MCNC: 3.3 G/DL (ref 3.5–5)
ALBUMIN/GLOB SERPL: 1 (ref 1–1.9)
ALP SERPL-CCNC: 81 U/L (ref 35–104)
ALT SERPL-CCNC: 12 U/L (ref 12–65)
ANION GAP SERPL CALC-SCNC: 11 MMOL/L (ref 9–18)
AST SERPL-CCNC: 17 U/L (ref 15–37)
BASOPHILS # BLD: 0 K/UL (ref 0–0.2)
BASOPHILS NFR BLD: 1 % (ref 0–2)
BILIRUB SERPL-MCNC: 0.5 MG/DL (ref 0–1.2)
BUN SERPL-MCNC: 9 MG/DL (ref 6–23)
CALCIUM SERPL-MCNC: 9.2 MG/DL (ref 8.8–10.2)
CHLORIDE SERPL-SCNC: 104 MMOL/L (ref 98–107)
CHOLEST SERPL-MCNC: 144 MG/DL (ref 0–200)
CO2 SERPL-SCNC: 24 MMOL/L (ref 20–28)
CREAT SERPL-MCNC: 0.74 MG/DL (ref 0.6–1.1)
DIFFERENTIAL METHOD BLD: ABNORMAL
EOSINOPHIL # BLD: 0.1 K/UL (ref 0–0.8)
EOSINOPHIL NFR BLD: 1 % (ref 0.5–7.8)
ERYTHROCYTE [DISTWIDTH] IN BLOOD BY AUTOMATED COUNT: 19.5 % (ref 11.9–14.6)
GLOBULIN SER CALC-MCNC: 3.3 G/DL (ref 2.3–3.5)
GLUCOSE SERPL-MCNC: 119 MG/DL (ref 70–99)
HCT VFR BLD AUTO: 32.4 % (ref 35.8–46.3)
HDLC SERPL-MCNC: 61 MG/DL (ref 40–60)
HDLC SERPL: 2.4 (ref 0–5)
HGB BLD-MCNC: 9.6 G/DL (ref 11.7–15.4)
IMM GRANULOCYTES # BLD AUTO: 0 K/UL (ref 0–0.5)
IMM GRANULOCYTES NFR BLD AUTO: 0 % (ref 0–5)
LDLC SERPL CALC-MCNC: 65 MG/DL (ref 0–100)
LYMPHOCYTES # BLD: 2.3 K/UL (ref 0.5–4.6)
LYMPHOCYTES NFR BLD: 31 % (ref 13–44)
MCH RBC QN AUTO: 22.5 PG (ref 26.1–32.9)
MCHC RBC AUTO-ENTMCNC: 29.6 G/DL (ref 31.4–35)
MCV RBC AUTO: 75.9 FL (ref 82–102)
MONOCYTES # BLD: 0.4 K/UL (ref 0.1–1.3)
MONOCYTES NFR BLD: 6 % (ref 4–12)
NEUTS SEG # BLD: 4.6 K/UL (ref 1.7–8.2)
NEUTS SEG NFR BLD: 62 % (ref 43–78)
NRBC # BLD: 0 K/UL (ref 0–0.2)
PLATELET # BLD AUTO: 481 K/UL (ref 150–450)
PMV BLD AUTO: 9.1 FL (ref 9.4–12.3)
POTASSIUM SERPL-SCNC: 3.7 MMOL/L (ref 3.5–5.1)
PROT SERPL-MCNC: 6.7 G/DL (ref 6.3–8.2)
RBC # BLD AUTO: 4.27 M/UL (ref 4.05–5.2)
SODIUM SERPL-SCNC: 139 MMOL/L (ref 136–145)
TRIGL SERPL-MCNC: 92 MG/DL (ref 0–150)
TSH, 3RD GENERATION: 1.31 UIU/ML (ref 0.27–4.2)
VLDLC SERPL CALC-MCNC: 18 MG/DL (ref 6–23)
WBC # BLD AUTO: 7.5 K/UL (ref 4.3–11.1)

## 2024-08-08 RX ORDER — MELOXICAM 7.5 MG/1
7.5 TABLET ORAL DAILY
Qty: 30 TABLET | Refills: 3 | Status: SHIPPED | OUTPATIENT
Start: 2024-08-08

## 2024-08-08 SDOH — ECONOMIC STABILITY: FOOD INSECURITY: WITHIN THE PAST 12 MONTHS, THE FOOD YOU BOUGHT JUST DIDN'T LAST AND YOU DIDN'T HAVE MONEY TO GET MORE.: NEVER TRUE

## 2024-08-08 SDOH — ECONOMIC STABILITY: HOUSING INSECURITY
IN THE LAST 12 MONTHS, WAS THERE A TIME WHEN YOU DID NOT HAVE A STEADY PLACE TO SLEEP OR SLEPT IN A SHELTER (INCLUDING NOW)?: NO

## 2024-08-08 SDOH — ECONOMIC STABILITY: FOOD INSECURITY: WITHIN THE PAST 12 MONTHS, YOU WORRIED THAT YOUR FOOD WOULD RUN OUT BEFORE YOU GOT MONEY TO BUY MORE.: NEVER TRUE

## 2024-08-08 SDOH — ECONOMIC STABILITY: INCOME INSECURITY: HOW HARD IS IT FOR YOU TO PAY FOR THE VERY BASICS LIKE FOOD, HOUSING, MEDICAL CARE, AND HEATING?: NOT VERY HARD

## 2024-08-08 ASSESSMENT — ENCOUNTER SYMPTOMS
DIARRHEA: 0
EYE ITCHING: 0
SINUS PAIN: 0
EYE PAIN: 0
EYE REDNESS: 0
SORE THROAT: 0
COLOR CHANGE: 0
EYE DISCHARGE: 0
SINUS PRESSURE: 0
ABDOMINAL DISTENTION: 0
COUGH: 0
VOMITING: 0
WHEEZING: 0
FACIAL SWELLING: 0
CONSTIPATION: 0
SHORTNESS OF BREATH: 0
NAUSEA: 0
BACK PAIN: 0
CHEST TIGHTNESS: 0
STRIDOR: 0
RHINORRHEA: 0
ABDOMINAL PAIN: 0
BLOOD IN STOOL: 0

## 2024-08-08 ASSESSMENT — PATIENT HEALTH QUESTIONNAIRE - PHQ9
SUM OF ALL RESPONSES TO PHQ QUESTIONS 1-9: 0
1. LITTLE INTEREST OR PLEASURE IN DOING THINGS: NOT AT ALL
1. LITTLE INTEREST OR PLEASURE IN DOING THINGS: NOT AT ALL
SUM OF ALL RESPONSES TO PHQ QUESTIONS 1-9: 0
2. FEELING DOWN, DEPRESSED OR HOPELESS: NOT AT ALL
SUM OF ALL RESPONSES TO PHQ QUESTIONS 1-9: 0
SUM OF ALL RESPONSES TO PHQ QUESTIONS 1-9: 0
2. FEELING DOWN, DEPRESSED OR HOPELESS: NOT AT ALL
SUM OF ALL RESPONSES TO PHQ9 QUESTIONS 1 & 2: 0
SUM OF ALL RESPONSES TO PHQ9 QUESTIONS 1 & 2: 0

## 2024-08-08 NOTE — PROGRESS NOTES
[facility]  _______________________________________  Jey Ruby MD                 [address] [place] [address]        [place] [address]                                                                                    Phone: [phone]                                                                                    Fax: [phone]    Emily Olson is a 44 y.o. female who is seen for evaluation of   Chief Complaint   Patient presents with    Joint Pain     R KNEE &  L HIP,  ONGOING     Injections     Knee       HPI:   Emily is seen today for f/u. She has not been seen in some time and states she is doing well.     - BP elevated today. She has had several normal readings as well as several elevated readings. Pt denies any cp, h/a, sob.     - HOMAR- hgb up 1 gr on iron TID. Denies any GI intolerance.       Joint Pain   Pertinent negatives include no fever or numbness.     Xray Result (most recent):  XR KNEE RIGHT (1-2 VIEWS) 10/12/2022    Narrative  EXAMINATION: XR KNEE RIGHT (1-2 VIEWS) 10/12/2022 9:32 AM    ACCESSION NUMBER: [id number]    COMPARISON: None available    INDICATION: Chronic right knee pain.    TECHNIQUE: 3 views of the right knee were obtained.    FINDINGS:  Linear sclerosis in the lateral tibial plateau. Otherwise, no acute fracture. No  dislocation. Tricompartmental joint space narrowing with osteophytosis,  including severe lateral compartment narrowing. Subchondral sclerosis and  subchondral cystic change at the lateral femorotibial articulation. No sizable  knee effusion.    Impression  -Severe lateral femorotibial predominant tricompartmental right knee  osteoarthrosis.    -Horizontally oriented linear sclerosis in the lateral tibial plateau, favored  to be degenerative, though insufficiency fracture could have a similar  appearance.    XR HIP LEFT (2-3 VIEWS)  Order: [id number]  Status: Final result       Visible to

## 2024-08-08 NOTE — ASSESSMENT & PLAN NOTE
Tolerated procedure well. Instructed to apply ice for 20 mins twice today. Recommended wt loss and regular exercise and aggressive PT.

## 2024-08-09 DIAGNOSIS — D50.0 IRON DEFICIENCY ANEMIA DUE TO CHRONIC BLOOD LOSS: ICD-10-CM

## 2024-08-09 LAB
FERRITIN SERPL-MCNC: 13 NG/ML (ref 8–388)
IRON SATN MFR SERPL: 5 % (ref 20–50)
IRON SERPL-MCNC: 18 UG/DL (ref 35–100)
TIBC SERPL-MCNC: 375 UG/DL (ref 240–450)
TRANSFERRIN SERPL-MCNC: 305 MG/DL (ref 200–360)
UIBC SERPL-MCNC: 357 UG/DL (ref 112–347)

## 2024-08-09 NOTE — ADDENDUM NOTE
Addended by: ARMANDO GONZALEZ III on: 8/9/2024 07:58 AM     Modules accepted: Orders, Level of Service

## 2024-08-22 ENCOUNTER — OFFICE VISIT (OUTPATIENT)
Dept: FAMILY MEDICINE CLINIC | Facility: CLINIC | Age: 44
End: 2024-08-22
Payer: COMMERCIAL

## 2024-08-22 VITALS
BODY MASS INDEX: 51.91 KG/M2 | WEIGHT: 293 LBS | SYSTOLIC BLOOD PRESSURE: 139 MMHG | HEIGHT: 63 IN | DIASTOLIC BLOOD PRESSURE: 88 MMHG | HEART RATE: 74 BPM

## 2024-08-22 DIAGNOSIS — E66.01 MORBID OBESITY (HCC): ICD-10-CM

## 2024-08-22 DIAGNOSIS — Z00.00 ENCOUNTER FOR ANNUAL PHYSICAL EXAM: Primary | ICD-10-CM

## 2024-08-22 DIAGNOSIS — Z12.11 SCREENING FOR COLON CANCER: ICD-10-CM

## 2024-08-22 DIAGNOSIS — D50.0 IRON DEFICIENCY ANEMIA DUE TO CHRONIC BLOOD LOSS: ICD-10-CM

## 2024-08-22 PROCEDURE — 99396 PREV VISIT EST AGE 40-64: CPT | Performed by: FAMILY MEDICINE

## 2024-08-22 RX ORDER — TIRZEPATIDE 2.5 MG/.5ML
2.5 INJECTION, SOLUTION SUBCUTANEOUS WEEKLY
Qty: 2 ML | Refills: 0 | Status: SHIPPED | OUTPATIENT
Start: 2024-08-22

## 2024-08-22 NOTE — PROGRESS NOTES
[facility]  _______________________________________  Jey Ruby MD                 [address]        [place] [address]                                                                                    Phone: [phone]                                                                                    Fax: [phone]    Emily Olson is a 44 y.o. female who is seen for evaluation of   Chief Complaint   Patient presents with    Annual Exam    Discuss Labs       HPI:   Emily is seen today for f/u and CPE.     - BP elevated today. She has had several normal readings as well as several elevated readings. Pt denies any cp, h/a, sob.     - HOMAR- hgb up 1 gr on iron TID. Denies any GI intolerance.     - inquires about GLP-1 for wt loss.           Lab Results   Component Value Date    IRON 18 (L) 08/08/2024    TIBC 375 08/08/2024    FERRITIN 13 08/08/2024         Component  Ref Range & Units 2 wk ago   Transferrin  200 - 360 mg/dL 305   .astcbc  Lab Results   Component Value Date     08/08/2024    K 3.7 08/08/2024     08/08/2024    CO2 24 08/08/2024    BUN 9 08/08/2024    CREATININE 0.74 08/08/2024    GLUCOSE 119 (H) 08/08/2024    CALCIUM 9.2 08/08/2024    BILITOT 0.5 08/08/2024    ALKPHOS 81 08/08/2024    AST 17 08/08/2024    ALT 12 08/08/2024    LABGLOM >90 08/08/2024    GFRAA >60 08/17/2022    GLOB 3.3 08/08/2024       Lab Results   Component Value Date    CHOL 144 08/08/2024    TRIG 92 08/08/2024    HDL 61 (H) 08/08/2024    LDL 65 08/08/2024    VLDL 18 08/08/2024    CHOLHDLRATIO 2.4 08/08/2024     Lab Results   Component Value Date    TSH 1.310 08/08/2024    TSHELE 1.66 08/07/2024     Lab Results   Component Value Date/Time    VITD25 17.7 08/08/2024 10:36 AM          Review of Systems:  Review of Systems   Constitutional:  Negative for activity change, appetite change, chills, diaphoresis, fatigue, fever and

## 2024-08-23 ASSESSMENT — ENCOUNTER SYMPTOMS
SINUS PAIN: 0
STRIDOR: 0
NAUSEA: 0
EYE ITCHING: 0
EYE DISCHARGE: 0
SINUS PRESSURE: 0
EYE PAIN: 0
DIARRHEA: 0
ABDOMINAL DISTENTION: 0
EYE REDNESS: 0
RHINORRHEA: 0
BACK PAIN: 0
SORE THROAT: 0
COUGH: 0
SHORTNESS OF BREATH: 0
COLOR CHANGE: 0
VOMITING: 0
BLOOD IN STOOL: 0
CONSTIPATION: 0
CHEST TIGHTNESS: 0
ABDOMINAL PAIN: 0
WHEEZING: 0
FACIAL SWELLING: 0

## 2024-09-03 ENCOUNTER — TELEPHONE (OUTPATIENT)
Dept: GASTROENTEROLOGY | Age: 44
End: 2024-09-03

## 2024-09-21 PROBLEM — Z12.11 SCREENING FOR COLON CANCER: Status: RESOLVED | Noted: 2024-08-22 | Resolved: 2024-09-21

## 2024-09-21 PROBLEM — Z00.00 ENCOUNTER FOR ANNUAL PHYSICAL EXAM: Status: RESOLVED | Noted: 2024-08-22 | Resolved: 2024-09-21

## 2024-11-14 ENCOUNTER — OFFICE VISIT (OUTPATIENT)
Dept: FAMILY MEDICINE CLINIC | Facility: CLINIC | Age: 44
End: 2024-11-14

## 2024-11-14 VITALS — WEIGHT: 293 LBS | BODY MASS INDEX: 51.91 KG/M2 | HEIGHT: 63 IN

## 2024-11-14 DIAGNOSIS — E66.01 MORBID OBESITY: ICD-10-CM

## 2024-11-14 DIAGNOSIS — R03.0 ELEVATED BLOOD PRESSURE READING: ICD-10-CM

## 2024-11-14 DIAGNOSIS — M17.11 TRICOMPARTMENT OSTEOARTHRITIS OF RIGHT KNEE: Primary | ICD-10-CM

## 2024-11-14 DIAGNOSIS — E55.9 VITAMIN D DEFICIENCY: ICD-10-CM

## 2024-11-14 ASSESSMENT — ENCOUNTER SYMPTOMS
COLOR CHANGE: 0
EYE PAIN: 0
SINUS PAIN: 0
DIARRHEA: 0
BLOOD IN STOOL: 0
WHEEZING: 0
EYE REDNESS: 0
NAUSEA: 0
CHEST TIGHTNESS: 0
FACIAL SWELLING: 0
ABDOMINAL DISTENTION: 0
CONSTIPATION: 0
ABDOMINAL PAIN: 0
COUGH: 0
SINUS PRESSURE: 0
EYE ITCHING: 0
SORE THROAT: 0
SHORTNESS OF BREATH: 0
BACK PAIN: 0
VOMITING: 0
EYE DISCHARGE: 0
RHINORRHEA: 0
STRIDOR: 0

## 2024-11-14 NOTE — PROGRESS NOTES
[facility]  _______________________________________  Jey Ruby MD                 [address] [place] [address]        [place] [address]                                                                                    Phone: [phone]                                                                                    Fax: [phone]    Emily Olson is a 44 y.o. female who is seen for evaluation of   Chief Complaint   Patient presents with    Knee Pain     R knee, ongoing. Wanting another injection. Last injection was 8-8-24       HPI:   Emily is seen today for f/u.     - BP elevated today. Home readings a bit lower. Pt denies any cp, h/a, sob.     - HOMAR- pt has finished Fe supplement but recheck labs have not been done. Denies any GI intolerance.     - chronic R knee. Had significant pain relief with last injection. Has wedding planned for tomorrow.           Review of Systems:  Review of Systems   Constitutional:  Negative for activity change, appetite change, chills, diaphoresis, fatigue, fever and unexpected weight change.   HENT:  Negative for congestion, ear discharge, ear pain, facial swelling, hearing loss, mouth sores, nosebleeds, postnasal drip, rhinorrhea, sinus pressure, sinus pain, sore throat and tinnitus.    Eyes:  Negative for pain, discharge, redness, itching and visual disturbance.   Respiratory:  Negative for cough, chest tightness, shortness of breath, wheezing and stridor.    Cardiovascular:  Negative for chest pain, palpitations and leg swelling.   Gastrointestinal:  Negative for abdominal distention, abdominal pain, blood in stool, constipation, diarrhea, nausea and vomiting.   Endocrine: Negative for cold intolerance, heat intolerance, polydipsia, polyphagia and polyuria.   Genitourinary:  Negative for decreased urine volume, difficulty urinating, dysuria, flank pain, frequency, hematuria and urgency.

## 2024-12-01 DIAGNOSIS — D50.0 IRON DEFICIENCY ANEMIA DUE TO CHRONIC BLOOD LOSS: ICD-10-CM

## 2024-12-02 RX ORDER — MELOXICAM 7.5 MG/1
7.5 TABLET ORAL DAILY
Qty: 30 TABLET | Refills: 3 | Status: SHIPPED | OUTPATIENT
Start: 2024-12-02

## 2025-01-24 DIAGNOSIS — E55.9 VITAMIN D DEFICIENCY: ICD-10-CM

## 2025-01-24 RX ORDER — CHOLECALCIFEROL (VITAMIN D3) 1250 MCG
1 CAPSULE ORAL WEEKLY
Qty: 12 CAPSULE | Refills: 1 | Status: SHIPPED | OUTPATIENT
Start: 2025-01-24

## 2025-07-18 DIAGNOSIS — E55.9 VITAMIN D DEFICIENCY: ICD-10-CM

## 2025-07-18 RX ORDER — CHOLECALCIFEROL (VITAMIN D3) 1250 MCG
1 CAPSULE ORAL WEEKLY
Qty: 4 CAPSULE | Refills: 0 | Status: SHIPPED | OUTPATIENT
Start: 2025-07-18

## 2025-08-10 DIAGNOSIS — E55.9 VITAMIN D DEFICIENCY: ICD-10-CM

## 2025-08-11 RX ORDER — CHOLECALCIFEROL (VITAMIN D3) 1250 MCG
1 CAPSULE ORAL WEEKLY
Qty: 12 CAPSULE | Refills: 2 | Status: SHIPPED | OUTPATIENT
Start: 2025-08-11 | End: 2026-04-20